# Patient Record
(demographics unavailable — no encounter records)

---

## 2025-07-22 NOTE — HISTORY OF PRESENT ILLNESS
[FreeTextEntry1] : ABDIAS LUZ Mar 19 1948  Language: English Date of First visit: 07/01/2024 Accompanied by: self Contact info: Referring Provider/PCP: Dr. mart Fax:   CC/ Problem List: Bladder Cancer Gross hematuria Phimosis =============================================================================== FIRST VISIT / Summary: Very pleasant 76 year old M here for gross hematuria. Passed a clot, the next day a smaller one, self resolved. as below ua/culture/cytology negative  Urologic history: 1993 had TURBT sounds like a small tumor, low grade. Likely Ta path not available. Underwent thiotepa intravesical treatments, had no recurrences and eventually stopped surveillance 2006 UTI, enlarged prostate and eventually underwent TURP. No further issues 2020 Hematuria, found stones, had cysto/stent, then passed the stone while waiting for surgery and stent removed  07/12/2024 Returned for cysto - mild phimosis noted, suggest clotrisone ------------------------------------------------------------------------------------------- INTERVAL VISITS:  The patient's medications and allergies were reviewed and edited below. Dated 07/21/2025  Here for follow-up. Has increased difficulty with breathing and hospitalized, now regaining strength.    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[place], internal medicine physician [place]  PSH: TURBT 1993, TURP 2006, cysto stent 2020  ROS: Review of Systems is as per HPI unless otherwise denoted below  =============================================================================== DATA: LABS (SELECTED):--------------------------------------------------------------------------------------------------- See scanned charts  RADS:------------------------------------------------------------------------------------------------------------------- 7/10/24: CT urogram with bilateral hydroureteronephrosis and trabeculated bladder. Right 5mm lower pole stone. Several AML up to 1.8cm  PATHOLOGY/CYTOLOGY:-------------------------------------------------------------------------------------------   VOIDING STUDIES: ---------------------------------------------------------------------------------------------------- 7/1/2024: PVR 30 on US 07/21/2025   STONE STUDIES: (Analysis/LLSA)----------------------------------------------------------------------------------   PROCEDURES: ----------------------------------------------------------------------------------------------- 07/12/2024 Cysto no mucosal abnormalities. Regrowth. Diverticulae / trabeculation prominent. UOs not identified, wide prior resection bladder neck.   =============================================================================== PHYSICAL EXAM:   FOCUSED: ----------------------------------------------------------------------------------------------------------------   ======================================================================================= DISCUSSION: ======================================================================================= ASSESSMENT and PLAN  1. Gross hematuria - hx low grade disease more than 30 years ago - CTU and cysto were negative  2. LUTS - doing well overall since TURP in 2006 - slightly increased PVR however still low risk. States Cr is stable  3. Phimosis - clotrisone cream as needed  ======================================================================================= 3g Thank you for allowing me to assist in the care of your patient. Should you have any questions please do not hesitate to reach out to me.   David Amin MD [facility] Physician [facility]  [place] Office: [address], [address] [place] [address] T: [phone] F: [phone]  [place] Office: [address], [address] [place] [address] T: [phone] F: [phone]    warm

## 2025-07-22 NOTE — HISTORY OF PRESENT ILLNESS
[FreeTextEntry1] : ABDIAS LUZ Mar 19 1948  Language: English Date of First visit: 07/01/2024 Accompanied by: self Contact info: Referring Provider/PCP: Dr. mart Fax:   CC/ Problem List: Bladder Cancer Gross hematuria Phimosis =============================================================================== FIRST VISIT / Summary: Very pleasant 76 year old M here for gross hematuria. Passed a clot, the next day a smaller one, self resolved. as below ua/culture/cytology negative  Urologic history: 1993 had TURBT sounds like a small tumor, low grade. Likely Ta path not available. Underwent thiotepa intravesical treatments, had no recurrences and eventually stopped surveillance 2006 UTI, enlarged prostate and eventually underwent TURP. No further issues 2020 Hematuria, found stones, had cysto/stent, then passed the stone while waiting for surgery and stent removed  07/12/2024 Returned for cysto - mild phimosis noted, suggest clotrisone ------------------------------------------------------------------------------------------- INTERVAL VISITS:  The patient's medications and allergies were reviewed and edited below. Dated 07/21/2025  Here for follow-up. Has increased difficulty with breathing and hospitalized, now regaining strength.    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[place], internal medicine physician [place]  PSH: TURBT 1993, TURP 2006, cysto stent 2020  ROS: Review of Systems is as per HPI unless otherwise denoted below  =============================================================================== DATA: LABS (SELECTED):--------------------------------------------------------------------------------------------------- See scanned charts  RADS:------------------------------------------------------------------------------------------------------------------- 7/10/24: CT urogram with bilateral hydroureteronephrosis and trabeculated bladder. Right 5mm lower pole stone. Several AML up to 1.8cm  PATHOLOGY/CYTOLOGY:-------------------------------------------------------------------------------------------   VOIDING STUDIES: ---------------------------------------------------------------------------------------------------- 7/1/2024: PVR 30 on US 07/21/2025   STONE STUDIES: (Analysis/LLSA)----------------------------------------------------------------------------------   PROCEDURES: ----------------------------------------------------------------------------------------------- 07/12/2024 Cysto no mucosal abnormalities. Regrowth. Diverticulae / trabeculation prominent. UOs not identified, wide prior resection bladder neck.   =============================================================================== PHYSICAL EXAM:   FOCUSED: ----------------------------------------------------------------------------------------------------------------   ======================================================================================= DISCUSSION: ======================================================================================= ASSESSMENT and PLAN  1. Gross hematuria - hx low grade disease more than 30 years ago - CTU and cysto were negative  2. LUTS - doing well overall since TURP in 2006 - slightly increased PVR however still low risk. States Cr is stable  3. Phimosis - clotrisone cream as needed  ======================================================================================= 3g Thank you for allowing me to assist in the care of your patient. Should you have any questions please do not hesitate to reach out to me.   David Amin MD [facility] Physician [facility]  [place] Office: [address], [address] [place] [address] T: [phone] F: [phone]  [place] Office: [address], [address] [place] [address] T: [phone] F: [phone]